# Patient Record
Sex: FEMALE | Race: WHITE
[De-identification: names, ages, dates, MRNs, and addresses within clinical notes are randomized per-mention and may not be internally consistent; named-entity substitution may affect disease eponyms.]

---

## 2021-03-21 ENCOUNTER — HOSPITAL ENCOUNTER (EMERGENCY)
Dept: HOSPITAL 93 - ER | Age: 51
Discharge: HOME | End: 2021-03-21
Payer: COMMERCIAL

## 2021-03-21 VITALS — HEIGHT: 60 IN | WEIGHT: 170 LBS | BODY MASS INDEX: 33.38 KG/M2

## 2021-03-21 DIAGNOSIS — L97.818: ICD-10-CM

## 2021-03-21 DIAGNOSIS — L97.828: Primary | ICD-10-CM

## 2023-03-16 ENCOUNTER — HOSPITAL ENCOUNTER (INPATIENT)
Dept: HOSPITAL 93 - ER | Age: 53
LOS: 7 days | Discharge: ANOTHER HOSPITAL | DRG: 65 | End: 2023-03-23
Attending: INTERNAL MEDICINE | Admitting: INTERNAL MEDICINE
Payer: COMMERCIAL

## 2023-03-16 VITALS — HEIGHT: 72 IN | BODY MASS INDEX: 27.09 KG/M2 | WEIGHT: 200 LBS

## 2023-03-16 DIAGNOSIS — I69.351: ICD-10-CM

## 2023-03-16 DIAGNOSIS — E11.65: ICD-10-CM

## 2023-03-16 DIAGNOSIS — R41.82: ICD-10-CM

## 2023-03-16 DIAGNOSIS — I63.9: Primary | ICD-10-CM

## 2023-03-16 DIAGNOSIS — E86.0: ICD-10-CM

## 2023-03-16 DIAGNOSIS — Z79.4: ICD-10-CM

## 2023-03-16 DIAGNOSIS — Z20.822: ICD-10-CM

## 2023-03-16 DIAGNOSIS — R47.81: ICD-10-CM

## 2023-03-16 PROCEDURE — 70544 MR ANGIOGRAPHY HEAD W/O DYE: CPT

## 2023-03-16 PROCEDURE — 70545 MR ANGIOGRAPHY HEAD W/DYE: CPT

## 2023-03-16 NOTE — NUR
FAMILIAR REFIERE QUE SALINAS MADRE NO PUEDE MOVERSE EL LADO DERECHO Y TIENE
DIFICULTAD AL HABLAR, SE UBICA PTE EN EL AREA DE OBSERVACION Y SE REALIZA EKG Y
SE PRESENTA PTE AL DR DAY, PTE NO RECUERDA LA HORA EXACTA CUANDO LE
COMENZO EL EPISODIO. SE UBICA PTE EN [place] CON BARANDA ELEVADA Y TIMBRE
ACCESIBLE.

## 2023-03-17 PROCEDURE — B24BZZZ ULTRASONOGRAPHY OF HEART WITH AORTA: ICD-10-PCS | Performed by: GENERAL PRACTICE

## 2023-03-17 PROCEDURE — B345ZZZ ULTRASONOGRAPHY OF BILATERAL COMMON CAROTID ARTERIES: ICD-10-PCS | Performed by: GENERAL PRACTICE

## 2023-03-17 PROCEDURE — BW38ZZZ MAGNETIC RESONANCE IMAGING (MRI) OF HEAD: ICD-10-PCS | Performed by: GENERAL PRACTICE

## 2023-03-17 NOTE — NUR
LEAH SLAUGHTER ORIENTA A PTE SOBRE TRATAMIENTO E INSTRUCCIONES A SEGUIR, MATT
REFIERE ENTENDER. LE COLECTA MUESTRAS, SE CANALIZA Y SE ADMINISTRA MEDICAMENTO
ARTHUR ORDEN MEDICA. PENDIENTE CT HEAD Y U/A

## 2023-03-17 NOTE — NUR
SE RECIBE PTE DEL TURNO ANTERIOR, ALERTA Y ORIENTADA EN BIN LENNY ESFERAS,
UBICADA EN [place] NIVEL MAS BAJO, ZHANG DE IDENTIFICACION Y BARANDAS ELEVADAS
POR PRECAUCION. SE OBSERVA CON BUEN PATRON RESPIRATORIO. PIEL TIBIA AL TACTO,
IV PATENTE Y ESE DE EDEMA O ERITEMA RECIBIENDO 0.9% NSS @150ML/HR. PENDIENTE
CONSULTA CON DR BROWN ([facility]).

## 2023-03-20 PROCEDURE — BW38YZZ MAGNETIC RESONANCE IMAGING (MRI) OF HEAD USING OTHER CONTRAST: ICD-10-PCS | Performed by: GENERAL PRACTICE
